# Patient Record
Sex: MALE | Race: WHITE | Employment: OTHER | URBAN - METROPOLITAN AREA
[De-identification: names, ages, dates, MRNs, and addresses within clinical notes are randomized per-mention and may not be internally consistent; named-entity substitution may affect disease eponyms.]

---

## 2017-01-17 ENCOUNTER — OFFICE VISIT (OUTPATIENT)
Dept: INTERNAL MEDICINE CLINIC | Facility: CLINIC | Age: 82
End: 2017-01-17

## 2017-01-17 VITALS
HEART RATE: 78 BPM | OXYGEN SATURATION: 96 % | WEIGHT: 148 LBS | TEMPERATURE: 98 F | DIASTOLIC BLOOD PRESSURE: 66 MMHG | RESPIRATION RATE: 18 BRPM | SYSTOLIC BLOOD PRESSURE: 110 MMHG | BODY MASS INDEX: 31 KG/M2

## 2017-01-17 DIAGNOSIS — N18.30 CHRONIC KIDNEY FAILURE, STAGE 3 (MODERATE) (HCC): ICD-10-CM

## 2017-01-17 DIAGNOSIS — E78.5 DYSLIPIDEMIA: ICD-10-CM

## 2017-01-17 DIAGNOSIS — R73.01 IFG (IMPAIRED FASTING GLUCOSE): Primary | ICD-10-CM

## 2017-01-17 DIAGNOSIS — D50.8 OTHER IRON DEFICIENCY ANEMIA: ICD-10-CM

## 2017-01-17 PROCEDURE — 99214 OFFICE O/P EST MOD 30 MIN: CPT | Performed by: INTERNAL MEDICINE

## 2017-01-17 NOTE — PROGRESS NOTES
Problem #1 impaired fasting glucose. Patient did have an A1c done at the [place] approximately a month ago. Level 6.1. He is up-to-date on his eye exam.  Does have macular degeneration worse on the left on the right. Denies polyuria polydipsia.   Does not ch

## 2017-02-06 DIAGNOSIS — K92.1 MELENA: ICD-10-CM

## 2017-02-06 DIAGNOSIS — I10 ESSENTIAL HYPERTENSION: Primary | ICD-10-CM

## 2017-02-06 RX ORDER — TAMSULOSIN HYDROCHLORIDE 0.4 MG/1
0.4 CAPSULE ORAL DAILY
Qty: 180 CAPSULE | Refills: 3 | Status: SHIPPED | OUTPATIENT
Start: 2017-02-06

## 2017-02-06 RX ORDER — LISINOPRIL 10 MG/1
10 TABLET ORAL DAILY
Qty: 90 TABLET | Refills: 3 | Status: SHIPPED | OUTPATIENT
Start: 2017-02-06

## 2017-02-06 RX ORDER — FINASTERIDE 5 MG/1
5 TABLET, FILM COATED ORAL DAILY
Qty: 90 TABLET | Refills: 3 | Status: SHIPPED | OUTPATIENT
Start: 2017-02-06

## 2017-02-06 RX ORDER — LOVASTATIN 40 MG/1
40 TABLET ORAL NIGHTLY
Qty: 90 TABLET | Refills: 3 | Status: SHIPPED | OUTPATIENT
Start: 2017-02-06

## 2017-04-25 ENCOUNTER — OFFICE VISIT (OUTPATIENT)
Dept: INTERNAL MEDICINE CLINIC | Facility: CLINIC | Age: 82
End: 2017-04-25

## 2017-04-25 DIAGNOSIS — E78.5 DYSLIPIDEMIA: ICD-10-CM

## 2017-04-25 DIAGNOSIS — H91.93 HEARING DEFICIT, BILATERAL: ICD-10-CM

## 2017-04-25 DIAGNOSIS — H35.30 MACULAR DEGENERATION: ICD-10-CM

## 2017-04-25 DIAGNOSIS — I87.2 VENOUS INSUFFICIENCY: ICD-10-CM

## 2017-04-25 DIAGNOSIS — D50.8 OTHER IRON DEFICIENCY ANEMIA: ICD-10-CM

## 2017-04-25 DIAGNOSIS — R73.01 IFG (IMPAIRED FASTING GLUCOSE): ICD-10-CM

## 2017-04-25 DIAGNOSIS — I10 ESSENTIAL HYPERTENSION WITH GOAL BLOOD PRESSURE LESS THAN 140/90: ICD-10-CM

## 2017-04-25 DIAGNOSIS — N18.4 CHRONIC KIDNEY FAILURE, STAGE 4 (SEVERE) (HCC): Primary | ICD-10-CM

## 2017-04-25 DIAGNOSIS — F51.01 PRIMARY INSOMNIA: ICD-10-CM

## 2017-04-25 PROBLEM — H91.90 HEARING DEFICIT: Status: ACTIVE | Noted: 2017-04-25

## 2017-04-25 PROBLEM — N18.9 CHRONIC KIDNEY FAILURE: Status: ACTIVE | Noted: 2017-04-25

## 2017-04-26 ENCOUNTER — OFFICE VISIT (OUTPATIENT)
Dept: INTERNAL MEDICINE CLINIC | Facility: CLINIC | Age: 82
End: 2017-04-26

## 2017-04-26 VITALS
TEMPERATURE: 97 F | HEART RATE: 86 BPM | WEIGHT: 143.88 LBS | OXYGEN SATURATION: 96 % | DIASTOLIC BLOOD PRESSURE: 60 MMHG | RESPIRATION RATE: 16 BRPM | BODY MASS INDEX: 23.97 KG/M2 | SYSTOLIC BLOOD PRESSURE: 100 MMHG | HEIGHT: 65 IN

## 2017-04-26 DIAGNOSIS — H35.30 MACULAR DEGENERATION: ICD-10-CM

## 2017-04-26 DIAGNOSIS — I87.2 VENOUS INSUFFICIENCY: ICD-10-CM

## 2017-04-26 DIAGNOSIS — F51.01 PRIMARY INSOMNIA: ICD-10-CM

## 2017-04-26 DIAGNOSIS — E78.5 DYSLIPIDEMIA: ICD-10-CM

## 2017-04-26 DIAGNOSIS — D50.8 OTHER IRON DEFICIENCY ANEMIA: ICD-10-CM

## 2017-04-26 DIAGNOSIS — H91.93 HEARING DEFICIT, BILATERAL: ICD-10-CM

## 2017-04-26 DIAGNOSIS — Z00.00 ENCOUNTER FOR ANNUAL HEALTH EXAMINATION: ICD-10-CM

## 2017-04-26 DIAGNOSIS — N18.4 CRF (CHRONIC RENAL FAILURE), STAGE 4 (SEVERE) (HCC): ICD-10-CM

## 2017-04-26 DIAGNOSIS — R73.01 IFG (IMPAIRED FASTING GLUCOSE): Primary | ICD-10-CM

## 2017-04-26 DIAGNOSIS — I10 ESSENTIAL HYPERTENSION WITH GOAL BLOOD PRESSURE LESS THAN 140/90: ICD-10-CM

## 2017-04-26 PROBLEM — H91.90 HEARING DEFICIT: Status: RESOLVED | Noted: 2017-04-25 | Resolved: 2017-04-26

## 2017-04-26 PROBLEM — N18.9 CHRONIC KIDNEY FAILURE: Status: RESOLVED | Noted: 2017-04-25 | Resolved: 2017-04-26

## 2017-04-26 PROBLEM — H91.90 HEARING DEFICIT: Status: ACTIVE | Noted: 2017-04-26

## 2017-04-26 PROBLEM — N18.9 CRF (CHRONIC RENAL FAILURE): Status: ACTIVE | Noted: 2017-04-26

## 2017-04-26 PROCEDURE — 83036 HEMOGLOBIN GLYCOSYLATED A1C: CPT | Performed by: INTERNAL MEDICINE

## 2017-04-26 PROCEDURE — 96160 PT-FOCUSED HLTH RISK ASSMT: CPT | Performed by: INTERNAL MEDICINE

## 2017-04-26 PROCEDURE — G0439 PPPS, SUBSEQ VISIT: HCPCS | Performed by: INTERNAL MEDICINE

## 2017-04-26 PROCEDURE — 80053 COMPREHEN METABOLIC PANEL: CPT | Performed by: INTERNAL MEDICINE

## 2017-04-26 NOTE — PATIENT INSTRUCTIONS
TOP FALL PREVENTION TIPS    INSIDE YOUR HOME   KITCHEN:  ? Use non skid mats only. ? Clean up spills as soon as they happen. ? Keep objects that you use often within easy reach.   BATHROOM:  ? Install grab bars on the bathroom walls beside the tub, show this frequency, by your insurer. Please check with your insurance carrier before scheduling to verify coverage.     PREVENTATIVE SERVICES  INDICATIONS AND SCHEDULE Internal Lab or Procedure External Lab or Procedure   Diabetes Screening      HbgA1C     At L Screen  Covered every 5 years No results found for this or any previous visit. No flowsheet data found. Fecal Occult Blood   Covered Annually No results found for: FOB, OCCULTSTOOL No flowsheet data found.      Barium Enema-   uncomfortable but covered virus carrier   Homosexual men   Illicit injectable drug abusers     Tetanus Toxoid- Only covered with a cut with metal- TD and TDaP Not covered by Medicare Part B) No orders found for this or any previous visit.  This may be covered with your prescription

## 2017-04-26 NOTE — PROGRESS NOTES
Problem #1 impaired fasting glucose check A1c problem #2 dyslipidemia lipid panel reviewed problem #3 history of iron deficient anemia check CBC problem #4 hypertension well-controlled check CBC, CMP problem #5 chronic renal failure stage IV check renal fu tablet (40 mg total) by mouth nightly. tamsulosin HCl 0.4 MG Oral Cap Take 1 capsule (0.4 mg total) by mouth daily. Take 2 capsules by mouth daily   Acetaminophen  MG Oral Tab CR Take 650 mg by mouth every 8 (eight) hours as needed for Pain.    Samt intact, both eyes   Ears:  Normal TM's and external ear canals, both ears   Nose: Nares normal, septum midline, mucosa normal, no drainage or sinus tenderness   Throat: Lips, mucosa, and tongue normal; teeth and gums normal   Neck: Supple, symmetrical, tra polyuria polydipsia.   Up-to-date on eye exam.  Will check A1c problem #2 dyslipidemia clinically stable problem #3 history of iron deficient anemia check CBC problem #4 hypertension well-controlled check CBC CMP problem #5 chronic renal failure stage IV ch prescribed: Able without help    Are you able to afford your medications?: Yes    Hearing Problems?: Yes     Functional Status     Hearing Problems?: Yes    Vision Problems? : Yes    Difficulty walking?: Yes    Difficulty dressing or bathing?: No    Proble ----------       Cardiovascular Disease Screening     LDL Annually LDL CHOLESTEROL (mg/dL)   Date Value   07/21/2016 57        EKG - w/ Initial Preventative Physical Exam only, or if medically necessary Electrocardiogram date    Colorectal Cancer Screeni 01/12/2016 1.89*    No flowsheet data found. Drug Serum Conc  Annually No results found for: DIGOXIN, DIG, VALP No flowsheet data found. Diabetes      HgbA1C  Annually HGBA1C (%)   Date Value   10/13/2016 6.1*       No flowsheet data found.     Crea

## 2017-05-30 ENCOUNTER — OFFICE VISIT (OUTPATIENT)
Dept: INTERNAL MEDICINE CLINIC | Facility: CLINIC | Age: 82
End: 2017-05-30

## 2017-05-30 VITALS
HEART RATE: 74 BPM | DIASTOLIC BLOOD PRESSURE: 72 MMHG | TEMPERATURE: 98 F | BODY MASS INDEX: 24 KG/M2 | SYSTOLIC BLOOD PRESSURE: 130 MMHG | RESPIRATION RATE: 16 BRPM | OXYGEN SATURATION: 98 % | WEIGHT: 144.63 LBS

## 2017-05-30 DIAGNOSIS — H35.30 MACULAR DEGENERATION: ICD-10-CM

## 2017-05-30 DIAGNOSIS — N18.30 CKD (CHRONIC KIDNEY DISEASE) STAGE 3, GFR 30-59 ML/MIN (HCC): Primary | ICD-10-CM

## 2017-05-30 DIAGNOSIS — R53.81 PHYSICAL DECONDITIONING: ICD-10-CM

## 2017-05-30 DIAGNOSIS — Z71.89 OTHER SPECIFIED COUNSELING: ICD-10-CM

## 2017-05-30 PROBLEM — N18.9 CRF (CHRONIC RENAL FAILURE): Status: RESOLVED | Noted: 2017-04-26 | Resolved: 2017-05-30

## 2017-05-30 PROCEDURE — 99213 OFFICE O/P EST LOW 20 MIN: CPT | Performed by: INTERNAL MEDICINE

## 2017-05-30 NOTE — PROGRESS NOTES
Kaitlin Gerber is here with his son. Also . Has been brought the social workers attention that there are some issues with cleanliness.   She did do a home visit and found unsatisfactory condition in the bathroom including urine for she is on the dany

## 2017-05-31 ENCOUNTER — NURSE ONLY (OUTPATIENT)
Dept: INTERNAL MEDICINE CLINIC | Facility: CLINIC | Age: 82
End: 2017-05-31

## 2017-05-31 DIAGNOSIS — I10 ESSENTIAL HYPERTENSION: ICD-10-CM

## 2017-05-31 PROCEDURE — 80053 COMPREHEN METABOLIC PANEL: CPT | Performed by: INTERNAL MEDICINE

## 2017-05-31 PROCEDURE — 36415 COLL VENOUS BLD VENIPUNCTURE: CPT | Performed by: INTERNAL MEDICINE

## 2017-07-25 ENCOUNTER — OFFICE VISIT (OUTPATIENT)
Dept: INTERNAL MEDICINE CLINIC | Facility: CLINIC | Age: 82
End: 2017-07-25

## 2017-07-25 VITALS
WEIGHT: 149.31 LBS | SYSTOLIC BLOOD PRESSURE: 112 MMHG | DIASTOLIC BLOOD PRESSURE: 64 MMHG | RESPIRATION RATE: 16 BRPM | OXYGEN SATURATION: 97 % | HEART RATE: 61 BPM | BODY MASS INDEX: 25 KG/M2

## 2017-07-25 DIAGNOSIS — F34.1 DYSTHYMIA: ICD-10-CM

## 2017-07-25 DIAGNOSIS — R26.9 GAIT DISORDER: ICD-10-CM

## 2017-07-25 DIAGNOSIS — R73.01 IFG (IMPAIRED FASTING GLUCOSE): Primary | ICD-10-CM

## 2017-07-25 LAB
EST. AVERAGE GLUCOSE BLD GHB EST-MCNC: 117 MG/DL (ref 68–126)
HBA1C MFR BLD HPLC: 5.7 % (ref ?–5.7)

## 2017-07-25 PROCEDURE — 36415 COLL VENOUS BLD VENIPUNCTURE: CPT | Performed by: INTERNAL MEDICINE

## 2017-07-25 PROCEDURE — 83036 HEMOGLOBIN GLYCOSYLATED A1C: CPT | Performed by: INTERNAL MEDICINE

## 2017-07-25 PROCEDURE — 99214 OFFICE O/P EST MOD 30 MIN: CPT | Performed by: INTERNAL MEDICINE

## 2017-07-25 NOTE — PROGRESS NOTES
Problem #1 impaired fasting glucose. Patient denies polyuria polydipsia. Up-to-date on eye exam.  Does not check home blood sugar. Problem #2 gait disturbance. Patient feels legs are getting weak. Has not fallen recently. Problem 3 dysthymia.   Lili

## 2017-07-26 ENCOUNTER — OFFICE VISIT (OUTPATIENT)
Dept: NEPHROLOGY | Facility: CLINIC | Age: 82
End: 2017-07-26

## 2017-07-26 VITALS
HEART RATE: 64 BPM | SYSTOLIC BLOOD PRESSURE: 136 MMHG | BODY MASS INDEX: 25 KG/M2 | WEIGHT: 150 LBS | DIASTOLIC BLOOD PRESSURE: 84 MMHG

## 2017-07-26 DIAGNOSIS — N18.30 CKD (CHRONIC KIDNEY DISEASE) STAGE 3, GFR 30-59 ML/MIN (HCC): Primary | ICD-10-CM

## 2017-07-26 DIAGNOSIS — I10 ESSENTIAL HYPERTENSION: ICD-10-CM

## 2017-07-26 PROCEDURE — 99203 OFFICE O/P NEW LOW 30 MIN: CPT | Performed by: INTERNAL MEDICINE

## 2017-07-26 NOTE — PROGRESS NOTES
Consult Note      REASON FOR CONSULT:  CKD 3         HPI:   Piyush Yip is a 80year old male with Patient presents with:  MD Mr. Bruce Johnson was seen in the nephrology clinic today in consultation for management of chr History    Marital status:              Spouse name:                       Years of education:                 Number of children:               Social History Main Topics    Smoking status: Never Smoker 07/21/2016   LYPERCENT 40.1 07/21/2016   MOPERCENT 11.4 07/21/2016   EOPERCENT 3.7 07/21/2016   BAPERCENT 0.5 07/21/2016   NE 2.63 07/21/2016   LYMABS 2.39 07/21/2016   MOABSO 0.68 (H) 07/21/2016   EOABSO 0.22 07/21/2016   BAABSO 0.03 07/21/2016     No res

## 2017-07-27 ENCOUNTER — TELEPHONE (OUTPATIENT)
Dept: INTERNAL MEDICINE CLINIC | Facility: CLINIC | Age: 82
End: 2017-07-27

## 2017-07-27 NOTE — TELEPHONE ENCOUNTER
Notified by Jonnie Kuo at Dr Kristin Richardson office that they are not in patient's insurance network (patient would have to call number on back of insurance card and self refer)  Also they called son's contact number because pateint does not answer the phone and son st

## 2017-07-31 ENCOUNTER — TELEPHONE (OUTPATIENT)
Dept: INTERNAL MEDICINE CLINIC | Facility: CLINIC | Age: 82
End: 2017-07-31

## 2017-07-31 NOTE — TELEPHONE ENCOUNTER
Per Yelena Darnell and Veena visited [facility] today. Veena will be going there for Respite Care and possible going there under [facility] depending on how he likes it.   Recent notes and Face Sheet faxed

## 2017-08-22 PROBLEM — R41.3 MEMORY DEFICIT: Status: ACTIVE | Noted: 2017-08-22

## 2017-08-22 PROBLEM — F34.1 DYSTHYMIA: Status: ACTIVE | Noted: 2017-08-22

## 2017-08-22 NOTE — PROGRESS NOTES
Dinora Wright is here with his son Jorge Blackmon. This is follow-up on depression. Family states he has had multiple falls 2 recently. No injury. Child does have memory deficit. Does live by himself. He denies any chest pain shortness of breath.   No cough or wheezin

## 2018-02-08 DIAGNOSIS — K92.1 MELENA: ICD-10-CM

## 2018-02-08 RX ORDER — TAMSULOSIN HYDROCHLORIDE 0.4 MG/1
CAPSULE ORAL
Qty: 180 CAPSULE | Refills: 0 | OUTPATIENT
Start: 2018-02-08

## 2018-02-08 RX ORDER — FINASTERIDE 5 MG/1
TABLET, FILM COATED ORAL
Qty: 90 TABLET | Refills: 0 | OUTPATIENT
Start: 2018-02-08

## 2018-02-08 RX ORDER — LOVASTATIN 40 MG/1
TABLET ORAL
Qty: 90 TABLET | Refills: 0 | OUTPATIENT
Start: 2018-02-08

## 2018-02-08 RX ORDER — LISINOPRIL 10 MG/1
TABLET ORAL
Qty: 90 TABLET | Refills: 0 | OUTPATIENT
Start: 2018-02-08

## 2018-02-22 ENCOUNTER — HOSPITAL ENCOUNTER (INPATIENT)
Facility: HOSPITAL | Age: 83
LOS: 4 days | Discharge: NURSING, REHAB OR LONG-TERM CARE | DRG: 683 | End: 2018-02-26
Attending: EMERGENCY MEDICINE | Admitting: INTERNAL MEDICINE
Payer: MEDICARE

## 2018-02-22 ENCOUNTER — APPOINTMENT (OUTPATIENT)
Dept: GENERAL RADIOLOGY | Facility: HOSPITAL | Age: 83
DRG: 683 | End: 2018-02-22
Attending: EMERGENCY MEDICINE
Payer: MEDICARE

## 2018-02-22 ENCOUNTER — APPOINTMENT (OUTPATIENT)
Dept: GENERAL RADIOLOGY | Facility: HOSPITAL | Age: 83
DRG: 683 | End: 2018-02-22
Attending: INTERNAL MEDICINE
Payer: MEDICARE

## 2018-02-22 DIAGNOSIS — N18.9 ACUTE RENAL FAILURE SUPERIMPOSED ON CHRONIC KIDNEY DISEASE, UNSPECIFIED CKD STAGE, UNSPECIFIED ACUTE RENAL FAILURE TYPE (HCC): Primary | ICD-10-CM

## 2018-02-22 DIAGNOSIS — I95.9 HYPOTENSION, UNSPECIFIED HYPOTENSION TYPE: ICD-10-CM

## 2018-02-22 DIAGNOSIS — E61.2 MAGNESIUM DEFICIENCY: ICD-10-CM

## 2018-02-22 DIAGNOSIS — E87.6 HYPOKALEMIA: ICD-10-CM

## 2018-02-22 DIAGNOSIS — R53.1 WEAKNESS GENERALIZED: ICD-10-CM

## 2018-02-22 DIAGNOSIS — N17.9 ACUTE RENAL FAILURE SUPERIMPOSED ON CHRONIC KIDNEY DISEASE, UNSPECIFIED CKD STAGE, UNSPECIFIED ACUTE RENAL FAILURE TYPE (HCC): Primary | ICD-10-CM

## 2018-02-22 PROBLEM — E86.0 DEHYDRATION: Status: ACTIVE | Noted: 2018-02-22

## 2018-02-22 LAB
ALBUMIN SERPL-MCNC: 3.5 G/DL (ref 3.5–4.8)
ALP LIVER SERPL-CCNC: 64 U/L (ref 45–117)
ALT SERPL-CCNC: 26 U/L (ref 17–63)
AST SERPL-CCNC: 24 U/L (ref 15–41)
ATRIAL RATE: 62 BPM
BASOPHILS # BLD AUTO: 0 X10(3) UL (ref 0–0.1)
BASOPHILS NFR BLD AUTO: 0 %
BILIRUB SERPL-MCNC: 0.6 MG/DL (ref 0.1–2)
BILIRUB UR QL STRIP.AUTO: NEGATIVE
BUN BLD-MCNC: 59 MG/DL (ref 8–20)
CALCIUM BLD-MCNC: 9 MG/DL (ref 8.3–10.3)
CHLORIDE: 108 MMOL/L (ref 101–111)
CO2: 19 MMOL/L (ref 22–32)
COLOR UR AUTO: YELLOW
CREAT BLD-MCNC: 2.34 MG/DL (ref 0.7–1.3)
EOSINOPHIL # BLD AUTO: 0.04 X10(3) UL (ref 0–0.3)
EOSINOPHIL NFR BLD AUTO: 0.8 %
ERYTHROCYTE [DISTWIDTH] IN BLOOD BY AUTOMATED COUNT: 13.7 % (ref 11.5–16)
GLUCOSE BLD-MCNC: 147 MG/DL (ref 70–99)
GLUCOSE UR STRIP.AUTO-MCNC: NEGATIVE MG/DL
HCT VFR BLD AUTO: 34.8 % (ref 37–53)
HGB BLD-MCNC: 11.4 G/DL (ref 13–17)
IMMATURE GRANULOCYTE COUNT: 0.02 X10(3) UL (ref 0–1)
IMMATURE GRANULOCYTE RATIO %: 0.4 %
KETONES UR STRIP.AUTO-MCNC: NEGATIVE MG/DL
LACTIC ACID: 1 MMOL/L (ref 0.5–2)
LEUKOCYTE ESTERASE UR QL STRIP.AUTO: NEGATIVE
LIPASE: 107 U/L (ref 73–393)
LYMPHOCYTES # BLD AUTO: 1.04 X10(3) UL (ref 0.9–4)
LYMPHOCYTES NFR BLD AUTO: 21.4 %
M PROTEIN MFR SERPL ELPH: 7.5 G/DL (ref 6.1–8.3)
MCH RBC QN AUTO: 31 PG (ref 27–33.2)
MCHC RBC AUTO-ENTMCNC: 32.8 G/DL (ref 31–37)
MCV RBC AUTO: 94.6 FL (ref 80–99)
MONOCYTES # BLD AUTO: 0.31 X10(3) UL (ref 0.1–1)
MONOCYTES NFR BLD AUTO: 6.4 %
NEUTROPHIL ABS PRELIM: 3.44 X10 (3) UL (ref 1.3–6.7)
NEUTROPHILS # BLD AUTO: 3.44 X10(3) UL (ref 1.3–6.7)
NEUTROPHILS NFR BLD AUTO: 71 %
NITRITE UR QL STRIP.AUTO: NEGATIVE
P AXIS: 41 DEGREES
P-R INTERVAL: 228 MS
PH UR STRIP.AUTO: 5 [PH] (ref 4.5–8)
PLATELET # BLD AUTO: 195 10(3)UL (ref 150–450)
POTASSIUM SERPL-SCNC: 3.3 MMOL/L (ref 3.6–5.1)
PROT UR STRIP.AUTO-MCNC: NEGATIVE MG/DL
Q-T INTERVAL: 444 MS
QRS DURATION: 84 MS
QTC CALCULATION (BEZET): 450 MS
R AXIS: -27 DEGREES
RBC # BLD AUTO: 3.68 X10(6)UL (ref 3.8–5.8)
RBC UR QL AUTO: NEGATIVE
RED CELL DISTRIBUTION WIDTH-SD: 47.3 FL (ref 35.1–46.3)
SODIUM SERPL-SCNC: 137 MMOL/L (ref 136–144)
SP GR UR STRIP.AUTO: 1.02 (ref 1–1.03)
T AXIS: -2 DEGREES
TROPONIN: <0.046 NG/ML (ref ?–0.05)
UROBILINOGEN UR STRIP.AUTO-MCNC: <2 MG/DL
VENTRICULAR RATE: 62 BPM
WBC # BLD AUTO: 4.9 X10(3) UL (ref 4–13)

## 2018-02-22 PROCEDURE — 71045 X-RAY EXAM CHEST 1 VIEW: CPT | Performed by: EMERGENCY MEDICINE

## 2018-02-22 PROCEDURE — 74018 RADEX ABDOMEN 1 VIEW: CPT | Performed by: INTERNAL MEDICINE

## 2018-02-22 PROCEDURE — 99222 1ST HOSP IP/OBS MODERATE 55: CPT | Performed by: INTERNAL MEDICINE

## 2018-02-22 RX ORDER — ACETAMINOPHEN 325 MG/1
650 TABLET ORAL EVERY 6 HOURS PRN
Status: DISCONTINUED | OUTPATIENT
Start: 2018-02-22 | End: 2018-02-26

## 2018-02-22 RX ORDER — SODIUM CHLORIDE 9 MG/ML
125 INJECTION, SOLUTION INTRAVENOUS CONTINUOUS
Status: DISCONTINUED | OUTPATIENT
Start: 2018-02-22 | End: 2018-02-23

## 2018-02-22 RX ORDER — FINASTERIDE 5 MG/1
5 TABLET, FILM COATED ORAL DAILY
Status: DISCONTINUED | OUTPATIENT
Start: 2018-02-22 | End: 2018-02-26

## 2018-02-22 RX ORDER — MULTIVITAMIN
1 TABLET ORAL DAILY
COMMUNITY

## 2018-02-22 RX ORDER — HEPARIN SODIUM 5000 [USP'U]/ML
5000 INJECTION, SOLUTION INTRAVENOUS; SUBCUTANEOUS EVERY 12 HOURS SCHEDULED
Status: DISCONTINUED | OUTPATIENT
Start: 2018-02-22 | End: 2018-02-26

## 2018-02-22 RX ORDER — ALFUZOSIN HYDROCHLORIDE 10 MG/1
10 TABLET, EXTENDED RELEASE ORAL
Status: DISCONTINUED | OUTPATIENT
Start: 2018-02-23 | End: 2018-02-23

## 2018-02-22 RX ORDER — CALCIUM CARBONATE/VITAMIN D3 600 MG-10
1 TABLET ORAL DAILY
COMMUNITY

## 2018-02-22 RX ORDER — SODIUM CHLORIDE 9 MG/ML
INJECTION, SOLUTION INTRAVENOUS CONTINUOUS
Status: DISCONTINUED | OUTPATIENT
Start: 2018-02-22 | End: 2018-02-25

## 2018-02-22 RX ORDER — ONDANSETRON 2 MG/ML
4 INJECTION INTRAMUSCULAR; INTRAVENOUS EVERY 6 HOURS PRN
Status: DISCONTINUED | OUTPATIENT
Start: 2018-02-22 | End: 2018-02-26

## 2018-02-22 RX ORDER — LOPERAMIDE HYDROCHLORIDE 2 MG/1
2 CAPSULE ORAL 4 TIMES DAILY PRN
Status: ON HOLD | COMMUNITY
End: 2018-02-25

## 2018-02-22 RX ORDER — SODIUM CHLORIDE 9 MG/ML
INJECTION, SOLUTION INTRAVENOUS CONTINUOUS
Status: CANCELLED | OUTPATIENT
Start: 2018-02-22 | End: 2018-02-22

## 2018-02-22 RX ORDER — ATORVASTATIN CALCIUM 10 MG/1
10 TABLET, FILM COATED ORAL NIGHTLY
Status: DISCONTINUED | OUTPATIENT
Start: 2018-02-22 | End: 2018-02-26

## 2018-02-22 NOTE — H&P
[facility]  History and Physical     Kei Wilburn Patient Status:  Emergency    1923 MRN [id number]   Location [address] Attending Jacqueline Coleman MD   Hosp Day # 0 PCP Fam Clark     Chief Complaint: fatigue capsules by mouth daily Disp: 180 capsule Rfl: 3   Multiple Vitamins-Minerals (OCUVITE ADULT 50+) Oral Cap Take 1 capsule by mouth daily. Disp:  Rfl:        Review of Systems:   A comprehensive 14 point review of systems was completed.     Pertinent positiv Further work up if no improvement  2. AK I on CKD -fluid hydration hold ACE inhibitors  3. Hypotension aggressive fluid hydration, hold blood pressure medication  4. Hyperlipidemia continue statin  5. BPH continue home medication  6.  Depression continue se

## 2018-02-23 LAB
ALBUMIN SERPL-MCNC: 2.9 G/DL (ref 3.5–4.8)
ALP LIVER SERPL-CCNC: 49 U/L (ref 45–117)
ALT SERPL-CCNC: 21 U/L (ref 17–63)
AST SERPL-CCNC: 20 U/L (ref 15–41)
BILIRUB SERPL-MCNC: 0.4 MG/DL (ref 0.1–2)
BUN BLD-MCNC: 45 MG/DL (ref 8–20)
CALCIUM BLD-MCNC: 8 MG/DL (ref 8.3–10.3)
CHLORIDE: 116 MMOL/L (ref 101–111)
CO2: 18 MMOL/L (ref 22–32)
CREAT BLD-MCNC: 1.55 MG/DL (ref 0.7–1.3)
FOLATE (FOLIC ACID), SERUM: 12.9 NG/ML (ref 8.7–24)
GLUCOSE BLD-MCNC: 84 MG/DL (ref 70–99)
HAV AB SERPL IA-ACNC: 652 PG/ML (ref 193–986)
HAV IGM SER QL: 1.6 MG/DL (ref 1.7–3)
M PROTEIN MFR SERPL ELPH: 6.3 G/DL (ref 6.1–8.3)
POTASSIUM SERPL-SCNC: 3.4 MMOL/L (ref 3.6–5.1)
SODIUM SERPL-SCNC: 143 MMOL/L (ref 136–144)

## 2018-02-23 PROCEDURE — 99232 SBSQ HOSP IP/OBS MODERATE 35: CPT | Performed by: INTERNAL MEDICINE

## 2018-02-23 RX ORDER — MAGNESIUM OXIDE 400 MG (241.3 MG MAGNESIUM) TABLET
400 TABLET ONCE
Status: COMPLETED | OUTPATIENT
Start: 2018-02-23 | End: 2018-02-23

## 2018-02-23 RX ORDER — LOPERAMIDE HYDROCHLORIDE 2 MG/1
2 CAPSULE ORAL 4 TIMES DAILY PRN
Status: DISCONTINUED | OUTPATIENT
Start: 2018-02-23 | End: 2018-02-24

## 2018-02-23 RX ORDER — CHOLESTYRAMINE LIGHT 4 G/5.7G
4 POWDER, FOR SUSPENSION ORAL DAILY
Status: DISCONTINUED | OUTPATIENT
Start: 2018-02-23 | End: 2018-02-26

## 2018-02-23 NOTE — PROGRESS NOTES
02/23/18 0500   Provider Notification   Reason for Communication Other (comment)  (pt's hr)   Provider Name (garth)   Method of Communication Page   Response Waiting for response   Notification Time 0500       Per parameters, MD notified about pt's HR

## 2018-02-23 NOTE — OCCUPATIONAL THERAPY NOTE
OCCUPATIONAL THERAPY QUICK EVALUATION - INPATIENT    Room Number: 526/526-A  Evaluation Date: 2/23/2018     Type of Evaluation: Initial and Quick Eval  Presenting Problem: RACHEL, fatigue    Physician Order: IP Consult to Occupational Therapy  Reason for Ther [facility]    Patient self-stated goal is not stated    OBJECTIVE  Precautions: Bed/chair alarm  Fall Risk: High fall risk    WEIGHT BEARING RESTRICTION                   PAIN ASSESSMENT  Ratin          COGNITION  Oriented x4  Follows one step command measures completed include AMPAC, ROM, MMT. The AM-KAREN ' '6-Clicks' Inpatient Daily Activity Short Form was completed and  this patient  is demonstrating a  63% degree impairment in activities of daily living.      Patient currently does not meet criteria f

## 2018-02-23 NOTE — PAYOR COMM NOTE
--------------  ADMISSION REVIEW         2/22    ED       Patient presents with:  Numbness Weakness (neurologic)  Hypotension (cardiovascular)     Stated Complaint: WEAKNESS          80year-old male presents with \"several weeks\" of generalized fatigue. ------                     CBC W/ DIFFERENTIAL[[id number]]          Abnormal            Final result                               VS  ED Triage Vitals   BP: 90/59  Pulse: 70  Resp: 18  T 97.4  SATS 94    EKG     Rate, intervals and axes as noted on EKG Rep

## 2018-02-23 NOTE — PHYSICAL THERAPY NOTE
PHYSICAL THERAPY QUICK EVALUATION - INPATIENT    Room Number: 526/526-A  Evaluation Date: 2/23/2018  Presenting Problem: Hypokalemia, gen weakness, hypotension, ARF and dehydration  Physician Order: PT Eval and Treat    Problem List  Principal Problem: light touch       AM-PAC '6-Clicks' INPATIENT SHORT FORM - BASIC MOBILITY  How much difficulty does the patient currently have. ..  -   Turning over in bed (including adjusting bedclothes, sheets and blankets)?: A Little   -   Sitting down on and standing u weakness, hypotension, ARF and dehydration. Pertinent comorbidities and personal factors impacting therapy include CKD, essential HTN, dementia and HL. Functional outcome measures completed include Elderly mobility scale.  The patient scores 4/20 on the E

## 2018-02-23 NOTE — PLAN OF CARE
Impaired Functional Mobility    • Achieve highest/safest level of mobility/gait Progressing        Patient/Family Goals    • Patient/Family Long Term Goal Progressing    • Patient/Family Short Term Goal Progressing            Pt a&ox3.  [place]. , on ra, skyler

## 2018-02-23 NOTE — CM/SW NOTE
Patient formerly lived at [facility] when his wife was alive. Currently in assisted living at [facility]. Son [facility]) reports patient getting weaker, more forgetful, having falls.  Has been evaluated by [facility] [address] [facility] but no decision as of

## 2018-02-23 NOTE — DIETARY NOTE
[facility]    NUTRITION INITIAL ASSESSMENT    Pt does not meet malnutrition criteria.     NUTRITION DIAGNOSIS/PROBLEM:    Predicted suboptimal energy intake related to recent inability to consume sufficient PO as evidenced by weakness, fatigue    NUTRI of oral supplements    MEDICATIONS:  Noted    LABS:  Mag 1.6, Pot 3.4    Pt is at moderate nutrition risk    FOLLOW-UP DATE: 2/27/18    Leeann Saavedra RD, LDN  Pager [phone]

## 2018-02-23 NOTE — PROGRESS NOTES
[facility]  Progress Note     Elida Princess Patient Status:  Inpatient    1923 MRN [id number]   [facility] 5NW-A Attending Jada Quintero MD   [address] Day # 1 PCP Rossy Ocasio     Chief Complaint: diarrhea, fatigue    S: Patient do ASSESSMENT / PLAN:     1. Diarrhea - no acute infection    Suspected chronic diarrhea from either loss of GB or other malabsorptive condition   Start cholestyramine, continue imodium for now   Check stool electrolyte   Outpatient GI follow up  2.  AK

## 2018-02-24 LAB
HAV IGM SER QL: 1.5 MG/DL (ref 1.7–3)
POTASSIUM SERPL-SCNC: 3.2 MMOL/L (ref 3.6–5.1)
POTASSIUM SERPL-SCNC: 3.8 MMOL/L (ref 3.6–5.1)

## 2018-02-24 PROCEDURE — 99232 SBSQ HOSP IP/OBS MODERATE 35: CPT | Performed by: INTERNAL MEDICINE

## 2018-02-24 RX ORDER — POTASSIUM CHLORIDE 20 MEQ/1
40 TABLET, EXTENDED RELEASE ORAL EVERY 4 HOURS
Status: COMPLETED | OUTPATIENT
Start: 2018-02-24 | End: 2018-02-24

## 2018-02-24 RX ORDER — LOPERAMIDE HYDROCHLORIDE 2 MG/1
2 CAPSULE ORAL
Status: DISCONTINUED | OUTPATIENT
Start: 2018-02-24 | End: 2018-02-26

## 2018-02-24 RX ORDER — MAGNESIUM OXIDE 400 MG (241.3 MG MAGNESIUM) TABLET
800 TABLET ONCE
Status: COMPLETED | OUTPATIENT
Start: 2018-02-24 | End: 2018-02-24

## 2018-02-24 RX ORDER — ALFUZOSIN HYDROCHLORIDE 10 MG/1
10 TABLET, EXTENDED RELEASE ORAL
Status: DISCONTINUED | OUTPATIENT
Start: 2018-02-24 | End: 2018-02-26

## 2018-02-24 NOTE — CONSULTS
[facility] Urology Consultation    Valeria Ward Patient Status:  Inpatient    1923 MRN [id number]   [facility] 5NW-A Attending Douglas Talley MD   [facility] Day # 2 PCP January Childers     Reason for Consultation:  Urinary retention Daily    Review of Systems:  Pertinent items are noted in HPI.  10 systems reviewed, pert pos neg as above    Physical Exam:  Temp (24hrs), Av.3 °F (36.8 °C), Min:97.9 °F (36.6 °C), Max:98.6 °F (37 °C)    /77 (BP Location: Left arm)   Pulse 62   T placed today for ~800 mL PVRs  -Had been doing well on tamsulosin and finasteride as an outpatient  -Likely symptoms worsened due to weakness and dec activity    PLAN  1. Continue finasteride and tamsulosin (uroxatral while inpt)  2. Continue mcgraw  3.  Voi

## 2018-02-24 NOTE — PROGRESS NOTES
[facility]  Progress Note     Marcello Bobby Patient Status:  Inpatient    1923 MRN [id number]   [facility] 5NW-A Attending Valeri Cruz MD   [facility] Day # 2 PCP Kota Interiano     Chief Complaint: diarrhea, fatigue    S: Patient st finasteride  5 mg Oral Daily   • atorvastatin  10 mg Oral Nightly   • Sertraline HCl  75 mg Oral Daily       ASSESSMENT / PLAN:     1.  Diarrhea - no acute infection    Suspected chronic diarrhea from either loss of GB or other malabsorptive condition   Sta

## 2018-02-24 NOTE — PLAN OF CARE
METABOLIC/FLUID AND ELECTROLYTES - ADULT    • Electrolytes maintained within normal limits Progressing        Patient/Family Goals    • Patient/Family Long Term Goal Progressing    • Patient/Family Short Term Goal Progressing        SAFETY ADULT - FALL

## 2018-02-25 ENCOUNTER — APPOINTMENT (OUTPATIENT)
Dept: GENERAL RADIOLOGY | Facility: HOSPITAL | Age: 83
DRG: 683 | End: 2018-02-25
Attending: INTERNAL MEDICINE
Payer: MEDICARE

## 2018-02-25 LAB
ADENOVIRUS PCR:: NEGATIVE
ALBUMIN SERPL-MCNC: 2.6 G/DL (ref 3.5–4.8)
ALP LIVER SERPL-CCNC: 46 U/L (ref 45–117)
ALT SERPL-CCNC: 20 U/L (ref 17–63)
AST SERPL-CCNC: 16 U/L (ref 15–41)
B PERT DNA SPEC QL NAA+PROBE: NEGATIVE
BILIRUB SERPL-MCNC: 0.4 MG/DL (ref 0.1–2)
BUN BLD-MCNC: 23 MG/DL (ref 8–20)
BUN BLD-MCNC: 25 MG/DL (ref 8–20)
C PNEUM DNA SPEC QL NAA+PROBE: NEGATIVE
CALCIUM BLD-MCNC: 7.7 MG/DL (ref 8.3–10.3)
CALCIUM BLD-MCNC: 7.9 MG/DL (ref 8.3–10.3)
CHLORIDE, STOOL: 71 MMOL/L
CHLORIDE: 120 MMOL/L (ref 101–111)
CHLORIDE: 123 MMOL/L (ref 101–111)
CO2: 14 MMOL/L (ref 22–32)
CO2: 19 MMOL/L (ref 22–32)
CORONAVIRUS 229E PCR:: NEGATIVE
CORONAVIRUS HKU1 PCR:: NEGATIVE
CORONAVIRUS NL63 PCR:: NEGATIVE
CORONAVIRUS OC43 PCR:: NEGATIVE
CREAT BLD-MCNC: 1.17 MG/DL (ref 0.7–1.3)
CREAT BLD-MCNC: 1.25 MG/DL (ref 0.7–1.3)
FLUAV RNA SPEC QL NAA+PROBE: NEGATIVE
FLUBV RNA SPEC QL NAA+PROBE: NEGATIVE
GLUCOSE BLD-MCNC: 89 MG/DL (ref 70–99)
GLUCOSE BLD-MCNC: 93 MG/DL (ref 70–99)
HAV IGM SER QL: 1.5 MG/DL (ref 1.7–3)
M PROTEIN MFR SERPL ELPH: 5.7 G/DL (ref 6.1–8.3)
METAPNEUMOVIRUS PCR:: NEGATIVE
MYCOPLASMA PNEUMONIA PCR:: NEGATIVE
NOROVIRUS 1 BY PCR: NOT DETECTED
NOROVIRUS 2 BY PCR: NOT DETECTED
PARAINFLUENZA 1 PCR:: NEGATIVE
PARAINFLUENZA 2 PCR:: NEGATIVE
PARAINFLUENZA 3 PCR:: NEGATIVE
PARAINFLUENZA 4 PCR:: NEGATIVE
POTASSIUM SERPL-SCNC: 3.3 MMOL/L (ref 3.6–5.1)
POTASSIUM SERPL-SCNC: 4.1 MMOL/L (ref 3.6–5.1)
POTASSIUM, FECAL: 29 MMOL/L
RHINOVIRUS/ENTERO PCR:: NEGATIVE
RSV RNA SPEC QL NAA+PROBE: NEGATIVE
SODIUM SERPL-SCNC: 144 MMOL/L (ref 136–144)
SODIUM SERPL-SCNC: 148 MMOL/L (ref 136–144)
SODIUM, FECAL: 94 MMOL/L

## 2018-02-25 PROCEDURE — 99232 SBSQ HOSP IP/OBS MODERATE 35: CPT | Performed by: INTERNAL MEDICINE

## 2018-02-25 PROCEDURE — 71046 X-RAY EXAM CHEST 2 VIEWS: CPT | Performed by: INTERNAL MEDICINE

## 2018-02-25 RX ORDER — POTASSIUM CHLORIDE 20 MEQ/1
40 TABLET, EXTENDED RELEASE ORAL 2 TIMES DAILY
Status: DISCONTINUED | OUTPATIENT
Start: 2018-02-25 | End: 2018-02-26

## 2018-02-25 RX ORDER — MAGNESIUM OXIDE 400 MG (241.3 MG MAGNESIUM) TABLET
400 TABLET 2 TIMES DAILY
Qty: 60 TABLET | Refills: 0 | Status: SHIPPED | OUTPATIENT
Start: 2018-02-25

## 2018-02-25 RX ORDER — POTASSIUM CHLORIDE 20 MEQ/1
40 TABLET, EXTENDED RELEASE ORAL EVERY 4 HOURS
Status: DISPENSED | OUTPATIENT
Start: 2018-02-25 | End: 2018-02-25

## 2018-02-25 RX ORDER — MAGNESIUM OXIDE 400 MG (241.3 MG MAGNESIUM) TABLET
400 TABLET
Status: DISCONTINUED | OUTPATIENT
Start: 2018-02-25 | End: 2018-02-26

## 2018-02-25 RX ORDER — SODIUM CHLORIDE 450 MG/100ML
INJECTION, SOLUTION INTRAVENOUS CONTINUOUS
Status: DISCONTINUED | OUTPATIENT
Start: 2018-02-25 | End: 2018-02-26

## 2018-02-25 RX ORDER — LOPERAMIDE HYDROCHLORIDE 2 MG/1
2 CAPSULE ORAL AS NEEDED
Qty: 60 CAPSULE | Refills: 0 | Status: SHIPPED | OUTPATIENT
Start: 2018-02-25

## 2018-02-25 RX ORDER — FLUTICASONE PROPIONATE 50 MCG
1 SPRAY, SUSPENSION (ML) NASAL DAILY
Status: DISCONTINUED | OUTPATIENT
Start: 2018-02-25 | End: 2018-02-26

## 2018-02-25 RX ORDER — CHOLESTYRAMINE LIGHT 4 G/5.7G
4 POWDER, FOR SUSPENSION ORAL DAILY
Qty: 60 EACH | Refills: 0 | Status: SHIPPED | OUTPATIENT
Start: 2018-02-26 | End: 2018-02-26

## 2018-02-25 NOTE — PROGRESS NOTES
[facility] Urology Progress Note    Atilio Lopez Patient Status:  Inpatient    1923 MRN [id number]   [facility] 5NW-A Attending Hugh Sterling MD   [facility] Day # 3 PCP Kerry Byrd     Subjective:  Atilio Lopez is a(n) 80 year failure type (HCC)     Hypokalemia     Weakness generalized     Hypotension     Dehydration     Intestinal malabsorption     Chronic diarrhea      IMPRESSION     1.            BPH with urinary retention  -Ambrosio placed 2/24 for ~800 mL PVRs  -Had been doing

## 2018-02-25 NOTE — PROGRESS NOTES
[facility]  Progress Note     Judy Bashir Patient Status:  Inpatient    1923 MRN [id number]   [facility] 5NW-A Attending Hanna Mistry MD   [facility] Day # 3 PCP Andrea Standing     Chief Complaint: diarrhea, fatigue    S: Patient ha reviewed in Epic.     Medications:   • Potassium Chloride ER  40 mEq Oral Q4H   • Alfuzosin HCl ER  10 mg Oral Daily with breakfast   • cholestyramine light  4 g Oral Daily   • Heparin Sodium (Porcine)  5,000 Units Subcutaneous 2 times per day   • finasteri

## 2018-02-25 NOTE — PLAN OF CARE
GENITOURINARY - ADULT    • Absence of urinary retention Progressing        METABOLIC/FLUID AND ELECTROLYTES - ADULT    • Electrolytes maintained within normal limits Progressing        Patient/Family Goals    • Patient/Family Long Term Goal Progressing

## 2018-02-25 NOTE — PROGRESS NOTES
Attempted to call [facility] to notify that pt is no longer discharging,unable to get through on phone.

## 2018-02-25 NOTE — HOME CARE LIAISON
TNL GIVEN VERBAL ORDER TO SEE PTNT AND OFFER HH. TNL MET WITH [facility] AND WENT OVER THE SERVICES AND BENEFITS OF HH.  [facility] DECLINED HH STATING THAT SR LAWRENCE HAD STAFF TO HELP WITH HIS NEEDS ALSO STATING THAT HIS SON TALKED TO SR LAWRENCE STAFF ABOUT CARING FOR PTN

## 2018-02-26 VITALS
OXYGEN SATURATION: 95 % | HEART RATE: 54 BPM | SYSTOLIC BLOOD PRESSURE: 99 MMHG | TEMPERATURE: 98 F | BODY MASS INDEX: 25 KG/M2 | DIASTOLIC BLOOD PRESSURE: 55 MMHG | WEIGHT: 147.88 LBS | RESPIRATION RATE: 20 BRPM

## 2018-02-26 LAB
BUN BLD-MCNC: 19 MG/DL (ref 8–20)
CALCIUM BLD-MCNC: 7.8 MG/DL (ref 8.3–10.3)
CHLORIDE: 120 MMOL/L (ref 101–111)
CO2: 18 MMOL/L (ref 22–32)
CREAT BLD-MCNC: 1.09 MG/DL (ref 0.7–1.3)
GLUCOSE BLD-MCNC: 86 MG/DL (ref 70–99)
HAV IGM SER QL: 1.5 MG/DL (ref 1.7–3)
POTASSIUM SERPL-SCNC: 3.6 MMOL/L (ref 3.6–5.1)
SODIUM SERPL-SCNC: 146 MMOL/L (ref 136–144)

## 2018-02-26 PROCEDURE — 99232 SBSQ HOSP IP/OBS MODERATE 35: CPT | Performed by: INTERNAL MEDICINE

## 2018-02-26 RX ORDER — DEXTROSE AND SODIUM CHLORIDE 5; .2 G/100ML; G/100ML
INJECTION, SOLUTION INTRAVENOUS CONTINUOUS
Status: DISCONTINUED | OUTPATIENT
Start: 2018-02-26 | End: 2018-02-26

## 2018-02-26 RX ORDER — POTASSIUM CHLORIDE 20 MEQ/1
40 TABLET, EXTENDED RELEASE ORAL EVERY 4 HOURS
Status: DISCONTINUED | OUTPATIENT
Start: 2018-02-26 | End: 2018-02-26

## 2018-02-26 RX ORDER — MAGNESIUM OXIDE 400 MG (241.3 MG MAGNESIUM) TABLET
800 TABLET
Status: DISCONTINUED | OUTPATIENT
Start: 2018-02-26 | End: 2018-02-26

## 2018-02-26 RX ORDER — CHOLESTYRAMINE LIGHT 4 G/5.7G
4 POWDER, FOR SUSPENSION ORAL 2 TIMES DAILY
Status: DISCONTINUED | OUTPATIENT
Start: 2018-02-26 | End: 2018-02-26

## 2018-02-26 RX ORDER — CHOLESTYRAMINE LIGHT 4 G/5.7G
4 POWDER, FOR SUSPENSION ORAL 2 TIMES DAILY
Qty: 60 EACH | Refills: 0 | Status: SHIPPED | OUTPATIENT
Start: 2018-02-26

## 2018-02-26 RX ORDER — MAGNESIUM OXIDE 400 MG (241.3 MG MAGNESIUM) TABLET
800 TABLET ONCE
Status: DISCONTINUED | OUTPATIENT
Start: 2018-02-26 | End: 2018-02-26

## 2018-02-26 RX ORDER — CHOLESTYRAMINE LIGHT 4 G/5.7G
4 POWDER, FOR SUSPENSION ORAL 2 TIMES DAILY
Qty: 60 EACH | Refills: 0 | Status: SHIPPED | OUTPATIENT
Start: 2018-02-26 | End: 2018-02-26

## 2018-02-26 NOTE — CM/SW NOTE
Patient will be transferred to [facility] [address] at [address] [place] [address] Rd., [address]  PM via [employer]. Patient, son and RN updated on d/c plan.

## 2018-02-26 NOTE — PROGRESS NOTES
[facility]  Progress Note     Lori Holden Patient Status:  Inpatient    1923 MRN [id number]   [facility] 5NW-A Attending Shane Street MD   [facility] Day # 4 PCP Norma Enamorado     Chief Complaint: diarrhea, fatigue    S: Patient ag HCl ER  10 mg Oral Daily with breakfast   • cholestyramine light  4 g Oral Daily   • Heparin Sodium (Porcine)  5,000 Units Subcutaneous 2 times per day   • finasteride  5 mg Oral Daily   • atorvastatin  10 mg Oral Nightly   • Sertraline HCl  75 mg Oral Gregorio García

## 2018-02-26 NOTE — CONSULTS
[facility]                       Gastroenterology [address] [facility] Gastroenterology    Valeria Ward Patient Status:  Inpatient    1923 MRN [id number]   [facility] 5NW-A Attending Douglas Talley MD   [facility] Day # 4 PCP Taylor Aponte TO history.   Medications:   dextrose 5 % and 0.2 % NaCl infusion  Intravenous Continuous   magnesium oxide (MAG-OX) tab 800 mg 800 mg Oral TID CC   [] Potassium Chloride ER (K-DUR M20) CR tab 40 mEq 40 mEq Oral Q4H   Potassium Chloride ER (K-DUR M20) C Infectious Disease:  No chronic infections or recent fevers prior to the acute illness            Cardiovascular: + HTN and dyslipidemia             Respiratory: No shortness of breath, asthma, copd, recurrent pneumonia            Hematologic: The pat 146 02/26/2018   K 3.6 02/26/2018    02/26/2018   CO2 18.0 02/26/2018   GLU 86 02/26/2018   CA 7.8 02/26/2018   MG 1.5 02/26/2018     Recent Labs   Lab  02/25/18   0649  02/25/18   1449  02/26/18   0614   GLU  89  93  86   BUN  23*  25*  19   JENNIFER studies negative; normal kidney functions; pt is tolerating PO intake. Can continue supportive care measures as pt and son would not like to pursue colonoscopy given the pt's advanced age.  Can increase cholestyramine, increase use of Imodium, stop oral mag

## 2018-02-26 NOTE — PAYOR COMM NOTE
--------------  CONTINUED STAY REVIEW    Payor: Kai Mae  Subscriber #:  Jenna Pace Number: LMGB #[id number]    Admit date: 2/22/18  Admit time: 5    Admitting Physician: Harjit Bray MD  Attending Physician:  Harjit Bray MD [id number] Given 5 mg Oral July HENDRICKS RN      Fluticasone Propionate [facility]) 50 MCG/ACT nasal spray 1 spray     Date Action Dose Route User    2/26/2018 7393 Given 1 spray Each Nare Fernando Zavaleta RN      Heparin Sodium (Porcine) 5000 UNIT/ML injection supplementation     2/24  Continue finasteride and tamsulosin (uroxatral while inpt)  2. Continue mcgraw  3. Voiding trial when more ambulatory  4.            Will follow  2/25   Continue finasteride and tamsulosin (uroxatral while inpt)

## 2018-02-26 NOTE — DISCHARGE SUMMARY
[facility] HOSPITALIST  DISCHARGE SUMMARY     Ursula Fry Patient Status:  Inpatient    1923 MRN [id number]   [facility] 5NW-A Attending Keila Tapia MD   [address] Day # 4 PCP Apple Cordova     Date of Admission: 2018  Date of Discharge significant comorbidities caused by the diarrhea. History indicates patient has had worsening diarrhea now for many years and has finally reached the point which is causing fatigue hypernatremia severe and resistant hypokalemia and hypomagnesemia.   He has focal consolidation.      Dictated by: Dylon Weathers MD on 2/22/2018 at 11:30     Approved by: Dylon Weathers MD            •     Incidental or significant findings and recommendations (brief descriptions):  • See above    ELECTROLYTES, STOOL   Order: [id number] Caps  Commonly known as:  IMODIUM  What changed:  · when to take this  · additional instructions      Take 1 capsule (2 mg total) by mouth as needed for Diarrhea. Start with 4mg at first episode of diarrhea.  Continue with 2mg as needed with further episode [id number]  [phone]    Call      Joofranc Brown, [address] [place] [address] Drive [facility]  [facility]  [phone]    Go on 3/12/2018  for a follow-up office visit with the GI PA at 09:20 am       Vital signs:  Temp:  [98.3 °F (36.8 °C)] 98.3 °F (36.8 °C

## 2018-02-27 NOTE — CM/SW NOTE
Patient discharged on 02/26/2018 as previously planned.          02/27/18 0800   Discharge disposition   Discharged to: ([facility])   Discharge transportation [employer] [facility]

## 2018-02-27 NOTE — PLAN OF CARE
Pt discharged home via [address] [place], [place] [address]. Prescriptions and discharge instructions given earlier to pt's son who will meet him at [facility]. He verbalized undestanding.

## 2021-07-20 NOTE — PATIENT INSTRUCTIONS
Kelsi Fernandez the physical therapist will be calling your apartment dissection up for therapy. Also our psychologist Dr. Juanita Holland will be calling you, to set up appointments.   I do want to see in the office in 1 month Ice/Face Mask

## (undated) NOTE — Clinical Note
April 27, 2017    Eddie Basurto  [address] [facility] [address]      Dear Brian Martinez: The following are the results of your recent tests. Please review the list of test results.   Your result is the value on the left; we have also supp

## (undated) NOTE — MR AVS SNAPSHOT
[facility]  [address] [place] [address] [phone]               Thank you for choosing us for your health care visit with Mirta Beck MD.  We are glad to serve you and happy to provide you with this **REFERRAL REQUEST**    Your physician has referred you to a specialist.  Your physician or the clinic staff will provide you with the phone number you should call to schedule your appointment.      If you are confident that your benefit plan will Physical deconditioning        Other specified counseling          Instructions and Information about Your Health     None      Allergies as of May 30, 2017     No Known Allergies                Today's Vital Signs     BP Pulse Temp Weight          130/72

## (undated) NOTE — MR AVS SNAPSHOT
[facility]  [address] [place] [address] [place] [address] [phone]               Thank you for choosing us for your health care visit with Alexei Meza MD.  We are glad to serve you and happy to provide you with this chair/bench and a hand held shower head while bathing/showering. BEDROOM:  ? Place light switches within reach of your bed and a night light between the bedroom and bathroom. ? Get up slowly from lying down or sitting if you get dizzy. ?  Keep a working Fasting Blood Sugar (FSB)   Patient must be diagnosed with one of the following:   • Hypertension   • Dyslipidemia   • Obesity (BMI ³30 kg/m2)   • Previous elevated impaired FBS or GTT   … or any two of the following:   • Overweight (BMI ³25 but <30)   • Covered annually for Diabetics, people with Glaucoma family history,   Americans over age 48   Americans over age 72 No flowsheet data found.  OK to schedule if you are in this risk group, make sure you have a referral   Prostate Cancer Scree Zoster (Not covered by Medicare Part B) No orders found for this or any previous visit. This may be covered with your pharmacy  prescription benefits     Recommended Websites for Advanced Directives    [email or web address]. org/publications/Documents/personal_d Take 1 capsule by mouth daily. tamsulosin HCl 0.4 MG Caps   Take 1 capsule (0.4 mg total) by mouth daily.  Take 2 capsules by mouth daily   Commonly known as:  FLOMAX                   Results of Recent Testing       MyChart               Educatio

## (undated) NOTE — IP AVS SNAPSHOT
[address]            (For Outpatient Use Only) Initial Admit Date: 2/22/2018   Inpt/Obs Admit Date: Inpt: 2/22/18 / Obs: N/A   Discharge Date:    Dinorah Binet:  [de-identified]   MRN: [de-identified]   CSN: [id number]        ENCOUNTER  Patient

## (undated) NOTE — MR AVS SNAPSHOT
[facility]  [address] [place] Garcia [address] [place] [id number] [phone]               Thank you for choosing us for your health care visit with Venessa Vargas MD.  We are glad to serve you and happy to provide you with this Biotin 1000 MCG Tabs   Take 5,000 mcg by mouth daily. CALCIUM + D OR   Take  by mouth. finasteride 5 MG Tabs   Take 1 tablet (5 mg total) by mouth daily.    Commonly known as:  PROSCAR            VITAMIN B [address]

## (undated) NOTE — IP AVS SNAPSHOT
Patient Demographics     Address  [place] [id number] Phone  [phone] [facility])      Emergency Contact(s)     Name Relation Home Work Mobile    Ang Razo Son [address] [facility]   [phone]      Allergies as of 2/26/201 Commonly known as:  IMODIUM      Take 1 capsule (2 mg total) by mouth as needed for Diarrhea. Start with 4mg at first episode of diarrhea. Continue with 2mg as needed with further episodes until diarrhea stops or loperamide totals 16mg per day.   MAX 16mg/d Given      [id number] Sertraline HCl (ZOLOFT) tab 75 mg 02/26/18 0844 Given      [id number] atorvastatin (LIPITOR) tab 10 mg 02/25/18 2159 Given      [id number] dextrose 5 % and 0.2 % NaCl infusion 02/26/18 1220 New Bag      [id number] finasteride (PROSCAR) ta ((4.0 - Albumin) x 0.8 + Calcium    Note: Calculation is only valid when Albumin is less than 4.0g/dL.      Sodium 146 136 - 144 mmol/L [facility]   Potassium 3.6 3.6 - 5.1 mmol/L [facility]   Chloride 120 101 - 111 mmol/L [facility]   CO2 18.0 22 Parainfluenza 1 PCR: Negative     Parainlfuenza 2 PCR Negative     Parainlfuenza 3 PCR Negative     Parainlfuenza 4 PCR Negative     Resp Syncytial Virus PCR Negative     Bordetella Pertussis PCR Negative     Chlamydia pneumonia PCR: Negative     Mycopla C. Difficile(Toxigenic) By Pcr Negative for Toxigenic C. difficile    Occult Blood Stool Once [[id number]]  (Normal) Collected:  02/22/18 1431    Order Status:  Completed Lab Status:  Final result Updated:  02/22/18 1642    Specimen:  Stool from Stool smokeless tobacco. He reports that he does not drink alcohol or use drugs. Family History: No family history on file. Allergies: No Known Allergies    Medications:    No current facility-administered medications on file prior to encounter.    Current PLT  195.0       Recent Labs   Lab  02/22/18   1021   GLU  147*   BUN  59*   CREATSERUM  2.34*   GFRAA  27*   GFRNAA  23*   CA  9.0   ALB  3.5   NA  137   K  3.3*   CL  108   CO2  19.0*   ALKPHO  64   AST  24   ALT  26   BILT  0.6   TP  7.5       No result Gastroenterology Consultation-[facility][MG.1]    Sarath Razo[MG.2] Patient Status:  Inpatient    1923 MRN [id number]   [facility] 5NW-A Attending Shane Street MD   Hosp Day #[MG.1] 4[MG. 2] PCP[M • Vitamin deficiency[MG. 2]      PSHx:[MG.1] History reviewed. No pertinent surgical history. [MG.2]  Medications:[MG.1]   dextrose 5 % and 0.2 % NaCl infusion  Intravenous Continuous   magnesium oxide (MAG-OX) tab 800 mg 800 mg Oral TID CC   [] Potas gastrointestinal malignancies; No family history of ulcer disease, or inflammatory bowel disease  ROS:  In addition to the pertinent positives described above:             Infectious Disease:  No chronic infections or recent fevers prior to the acute illne Ext: No peripheral edema or cyanosis  Skin: Warm and dry  Psychiatric: Appropriate mood and congruent affect without obvious depression or anxiety  Labs:[MG.1]   Lab Results  Component Value Date   CREATSERUM 1.09 02/26/2018   BUN 19 02/26/2018    02 which has favored loose, non-bloody stools for 5 yrs. He reports an unremarkable colonoscopy at the onset of symptoms and no improvement with dietary changes. Stools occur without regards to PO intake and he has frequent nocturnal incontinence.  On exam, ab MG.1 - Sydni Romo, APN on 2/26/2018  2:30 PM  MG.2 - Sydni Odonnelline, APN on 2/26/2018  2:31 PM  MG.3 - Sydni Odonnelline, APN on 2/26/2018  3:23 PM  MG.4 - Sydni Odonnelline, APN on 2/26/2018  3:41 PM                     D/C Summary     No notes

## (undated) NOTE — MR AVS SNAPSHOT
[facility]  [address] Nicolas Garcia [address] [place] [address] [id number] [phone]               Thank you for choosing us for your health care visit with Emanuel Brand MD.  We are glad to serve you and happy to provide you with this HM VITAMIN B COMPLEX/VITAMIN C Tabs   Take  by mouth daily. Take one dropperful by mouth daily           lisinopril 10 MG Tabs   Take 1 tablet (10 mg total) by mouth daily.    Commonly known as:  PRINIVIL,ZESTRIL           Lovastatin 40 MG Tabs   Take 1 ta

## (undated) NOTE — LETTER
July 26, 2017    Valeria Ward  [address] [facility] [id number]      Dear Mercedes Santo: The following are the results of your recent tests. Please review the list of test results.   Your result is the value on the left; we have also suppl